# Patient Record
Sex: FEMALE | NOT HISPANIC OR LATINO | URBAN - METROPOLITAN AREA
[De-identification: names, ages, dates, MRNs, and addresses within clinical notes are randomized per-mention and may not be internally consistent; named-entity substitution may affect disease eponyms.]

---

## 2018-09-22 ENCOUNTER — INPATIENT (INPATIENT)
Facility: HOSPITAL | Age: 58
LOS: 0 days | Discharge: HOME | DRG: 158 | End: 2018-09-23
Attending: SURGERY | Admitting: SURGERY
Payer: MEDICAID

## 2018-09-22 VITALS
SYSTOLIC BLOOD PRESSURE: 155 MMHG | HEART RATE: 84 BPM | HEIGHT: 62 IN | OXYGEN SATURATION: 99 % | DIASTOLIC BLOOD PRESSURE: 94 MMHG | WEIGHT: 110.01 LBS | TEMPERATURE: 99 F | RESPIRATION RATE: 16 BRPM

## 2018-09-22 DIAGNOSIS — S02.609A FRACTURE OF MANDIBLE, UNSPECIFIED, INITIAL ENCOUNTER FOR CLOSED FRACTURE: ICD-10-CM

## 2018-09-22 PROCEDURE — 70486 CT MAXILLOFACIAL W/O DYE: CPT | Mod: 26

## 2018-09-22 PROCEDURE — 72125 CT NECK SPINE W/O DYE: CPT | Mod: 26

## 2018-09-22 PROCEDURE — 70450 CT HEAD/BRAIN W/O DYE: CPT | Mod: 26

## 2018-09-22 PROCEDURE — 71046 X-RAY EXAM CHEST 2 VIEWS: CPT | Mod: 26

## 2018-09-22 PROCEDURE — 99285 EMERGENCY DEPT VISIT HI MDM: CPT | Mod: 25

## 2018-09-22 PROCEDURE — 93010 ELECTROCARDIOGRAM REPORT: CPT

## 2018-09-22 RX ORDER — ENOXAPARIN SODIUM 100 MG/ML
40 INJECTION SUBCUTANEOUS DAILY
Qty: 0 | Refills: 0 | Status: DISCONTINUED | OUTPATIENT
Start: 2018-09-22 | End: 2018-09-23

## 2018-09-22 RX ORDER — TETANUS TOXOID, REDUCED DIPHTHERIA TOXOID AND ACELLULAR PERTUSSIS VACCINE, ADSORBED 5; 2.5; 8; 8; 2.5 [IU]/.5ML; [IU]/.5ML; UG/.5ML; UG/.5ML; UG/.5ML
0.5 SUSPENSION INTRAMUSCULAR ONCE
Qty: 0 | Refills: 0 | Status: COMPLETED | OUTPATIENT
Start: 2018-09-22 | End: 2018-09-22

## 2018-09-22 RX ORDER — ACETAMINOPHEN 500 MG
975 TABLET ORAL EVERY 6 HOURS
Qty: 0 | Refills: 0 | Status: DISCONTINUED | OUTPATIENT
Start: 2018-09-22 | End: 2018-09-23

## 2018-09-22 RX ORDER — OXYCODONE HYDROCHLORIDE 5 MG/1
5 TABLET ORAL EVERY 6 HOURS
Qty: 0 | Refills: 0 | Status: DISCONTINUED | OUTPATIENT
Start: 2018-09-22 | End: 2018-09-23

## 2018-09-22 RX ORDER — SODIUM CHLORIDE 9 MG/ML
1000 INJECTION, SOLUTION INTRAVENOUS
Qty: 0 | Refills: 0 | Status: DISCONTINUED | OUTPATIENT
Start: 2018-09-22 | End: 2018-09-23

## 2018-09-22 RX ORDER — ACETAMINOPHEN 500 MG
975 TABLET ORAL ONCE
Qty: 0 | Refills: 0 | Status: COMPLETED | OUTPATIENT
Start: 2018-09-22 | End: 2018-09-22

## 2018-09-22 RX ORDER — PENICILLIN G POTASSIUM 5000000 [IU]/1
2 POWDER, FOR SOLUTION INTRAMUSCULAR; INTRAPLEURAL; INTRATHECAL; INTRAVENOUS EVERY 6 HOURS
Qty: 0 | Refills: 0 | Status: DISCONTINUED | OUTPATIENT
Start: 2018-09-22 | End: 2018-09-23

## 2018-09-22 RX ADMIN — PENICILLIN G POTASSIUM 100 MILLION UNIT(S): 5000000 POWDER, FOR SOLUTION INTRAMUSCULAR; INTRAPLEURAL; INTRATHECAL; INTRAVENOUS at 23:38

## 2018-09-22 RX ADMIN — TETANUS TOXOID, REDUCED DIPHTHERIA TOXOID AND ACELLULAR PERTUSSIS VACCINE, ADSORBED 0.5 MILLILITER(S): 5; 2.5; 8; 8; 2.5 SUSPENSION INTRAMUSCULAR at 20:20

## 2018-09-22 RX ADMIN — Medication 975 MILLIGRAM(S): at 20:21

## 2018-09-22 RX ADMIN — Medication 975 MILLIGRAM(S): at 23:01

## 2018-09-22 NOTE — H&P ADULT - NSHPLABSRESULTS_GEN_ALL_CORE
CT FACE: Comminuted right parasymphyseal mandibular body fracture and   complete transverse and displaced fracture of the neck of the right   mandibular condyle. Medial displacement of the right condylar head with   widening of the TMJ.    Results discussed with Dr. Dhaliwal by Dr. Quinones at 9:00 PM 9/22/18

## 2018-09-22 NOTE — H&P ADULT - NSHPRISKHEPCSCREEN_GEN_A_CORE
Past Medical History:   Diagnosis Date    Bipolar 1 disorder 2011    Hypertension        History reviewed. No pertinent surgical history.    Review of patient's allergies indicates:  No Known Allergies    No current facility-administered medications on file prior to encounter.      Current Outpatient Medications on File Prior to Encounter   Medication Sig    aspirin (ECOTRIN) 81 MG EC tablet Take 1 tablet (81 mg total) by mouth once daily.    atorvastatin (LIPITOR) 80 MG tablet Take 1 tablet (80 mg total) by mouth once daily.    divalproex (DEPAKOTE) 250 MG EC tablet Take 500 mg by mouth 3 (three) times daily. 250mg daily  500mg afternoon and 500mg nightly    ondansetron (ZOFRAN-ODT) 4 MG TbDL Take 1 tablet (4 mg total) by mouth every 8 (eight) hours as needed (nausea).    pantoprazole (PROTONIX) 40 MG tablet Take 1 tablet (40 mg total) by mouth once daily.    QUEtiapine (SEROQUEL) 100 MG Tab Take by mouth every evening.    [DISCONTINUED] carvedilol (COREG) 6.25 MG tablet Take 0.5 tablets (3.125 mg total) by mouth 2 (two) times daily.    oxyCODONE-acetaminophen (PERCOCET) 5-325 mg per tablet Take 1 tablet by mouth every 6 (six) hours as needed for Pain.    [DISCONTINUED] valACYclovir (VALTREX) 1000 MG tablet Take 1 tablet (1,000 mg total) by mouth 3 (three) times daily. for 7 days     Family History     Problem Relation (Age of Onset)    Cancer Mother    Diabetes Father    Heart disease Father        Tobacco Use    Smoking status: Never Smoker    Smokeless tobacco: Never Used   Substance and Sexual Activity    Alcohol use: No    Drug use: No    Sexual activity: Yes     Partners: Female     Review of Systems   Constitution: Negative for chills, decreased appetite, diaphoresis, fever, weakness, malaise/fatigue, night sweats, weight gain and weight loss.   Eyes: Negative.    Cardiovascular: Negative for chest pain, irregular heartbeat, leg swelling, near-syncope, orthopnea, palpitations, paroxysmal  nocturnal dyspnea and syncope.   Respiratory: Negative for cough, shortness of breath, sputum production and wheezing.    Endocrine: Negative.    Hematologic/Lymphatic: Negative for bleeding problem.   Skin: Negative for color change, flushing, rash and suspicious lesions.   Musculoskeletal: Negative.    Gastrointestinal: Negative for bloating, abdominal pain, change in bowel habit, constipation, diarrhea, heartburn, melena, nausea and vomiting.   Genitourinary: Negative for flank pain, frequency, hematuria, incomplete emptying and urgency.   Neurological: Negative for dizziness, focal weakness, headaches, light-headedness, numbness, paresthesias, seizures and sensory change.   Psychiatric/Behavioral: Negative for altered mental status. The patient is not nervous/anxious.      Objective:     Vital Signs (Most Recent):  Temp: 98.4 °F (36.9 °C) (09/02/18 1152)  Pulse: 64 (09/02/18 1200)  Resp: 17 (09/02/18 1152)  BP: 117/71 (09/02/18 1152)  SpO2: 99 % (09/02/18 1152) Vital Signs (24h Range):  Temp:  [97.6 °F (36.4 °C)-98.4 °F (36.9 °C)] 98.4 °F (36.9 °C)  Pulse:  [61-96] 64  Resp:  [14-20] 17  SpO2:  [95 %-99 %] 99 %  BP: (105-146)/(55-99) 117/71       Weight: 124.3 kg (274 lb 0.5 oz)  Body mass index is 34.25 kg/m².    SpO2: 99 %  O2 Device (Oxygen Therapy): room air    Physical Exam   Constitutional: He is oriented to person, place, and time. He appears well-developed and well-nourished. No distress.   HENT:   Head: Normocephalic and atraumatic.   Mouth/Throat: Oropharynx is clear and moist.   Eyes: EOM are normal. Pupils are equal, round, and reactive to light.   Neck: Normal range of motion. Neck supple. No JVD present.   Cardiovascular: Normal rate and regular rhythm. Exam reveals no gallop and no friction rub.   No murmur heard.  Pulmonary/Chest: Effort normal and breath sounds normal. No respiratory distress. He has no wheezes. He has no rales. He exhibits no tenderness.   Abdominal: Soft. Bowel sounds are  normal. He exhibits no distension. There is no tenderness.   Musculoskeletal: Normal range of motion. He exhibits no edema.   Neurological: He is alert and oriented to person, place, and time.   Skin: Skin is warm and dry. No erythema.   Psychiatric: He has a normal mood and affect. His behavior is normal. Judgment and thought content normal.       Significant Labs: All pertinent lab results from the last 24 hours have been reviewed.    Significant Imaging: Reviewed in EPIC.   Unable to offer due to clinical condition

## 2018-09-22 NOTE — ED PROVIDER NOTE - MOUTH
MANDIBULAR SWELLING/+mild trismus. No drooling. Limited ability to open mouth, secondary to pain. Facial edema over mandible, R greater than L. Area of ecchymosis under chin. Mal occlusion by Hx. +Tenderness over mandible R side. Facial muscles symmetric.

## 2018-09-22 NOTE — H&P ADULT - ASSESSMENT
A/P 58F who sustained a symphysis and right condyle neck fracture after a mechanical fall last night.  -admit to plastic surgery for surgical repair of fractures  -liquid diet  -pre-op labs (CBC, BMP, coags, type and sceen, EKG)  -elevate HOB  -NPOpMN  -tylenol prn for pain, oxy for severe  -IVF  -lovenox  -SCDs  -discussed with Dr. Pyle

## 2018-09-22 NOTE — ED ADULT NURSE NOTE - NSIMPLEMENTINTERV_GEN_ALL_ED
Implemented All Fall with Harm Risk Interventions:  Fork to call system. Call bell, personal items and telephone within reach. Instruct patient to call for assistance. Room bathroom lighting operational. Non-slip footwear when patient is off stretcher. Physically safe environment: no spills, clutter or unnecessary equipment. Stretcher in lowest position, wheels locked, appropriate side rails in place. Provide visual cue, wrist band, yellow gown, etc. Monitor gait and stability. Monitor for mental status changes and reorient to person, place, and time. Review medications for side effects contributing to fall risk. Reinforce activity limits and safety measures with patient and family. Provide visual clues: red socks.

## 2018-09-22 NOTE — PROGRESS NOTE ADULT - SUBJECTIVE AND OBJECTIVE BOX
Please refer to previous dental consult note for additional information.     INTERVAL HPI/OVERNIGHT EVENTS: 59 yo F with no significant medical history presents to ED due to lower right facial pain and inability to open/close. Pt reports falling last night while wearing heels and reports LOC. Pt reports pain went away, but returned earlier today. Pt also has been unable eat due to trismus.    MEDICATIONS  (STANDING):    MEDICATIONS  (PRN): tylenol      Allergies    No Known Allergies    Intolerances        Vital Signs Last 24 Hrs  T(C): 36.4 (22 Sep 2018 20:12), Max: 37 (22 Sep 2018 19:10)  T(F): 97.6 (22 Sep 2018 20:12), Max: 98.6 (22 Sep 2018 19:10)  HR: 65 (22 Sep 2018 21:10) (65 - 84)  BP: 159/95 (22 Sep 2018 21:10) (155/94 - 181/99)  BP(mean): --  RR: 16 (22 Sep 2018 21:10) (16 - 16)  SpO2: 98% (22 Sep 2018 21:10) (98% - 99%)      CLINICAL EXAM: Limited oral exam completed due to trismus.  EOE: No LAD. Trismus with jaw deviation to right upon opening. Swelling present associated with lower right jaw that is tender to palpation.     IOE: Limited intraoral exam completed due to trismus. Pt reports step in occlusion between tooth #30 and #31. Pain on palpation of buccal mucosa around #30 and #31.    RADIOLOGY & ADDITIONAL TESTS:  Head CT without contrast taken but not read by radiologist prior to dental consult. Mandibular fracture appears to be present    ASSESSMENT: 59 yo F with no significant medical history has mandibular fracture s/p fall in heels.     PLAN:  Consult facial trauma team to address mandibular fracture.    PROCEDURE:  No dental procedure indicated at this time.    RECOMMENDATIONS:  1) Consult facial trauma team to address mandibular fracture.  2) F/U with outpatient dentist for comprehensive dental care upon discharge.  3) If swelling, fever, difficulty breathing/swallowing occurs, page Dental.     Medina Mcgrath DDS, Pager #[phone] Please refer to previous dental consult note for additional information.     INTERVAL HPI/OVERNIGHT EVENTS: 57 yo F with no significant medical history presents to ED due to lower right facial pain and inability to open/close. Pt reports falling last night while wearing heels and hitting her chin. Pt reports LOC. Pt reports pain went away, but returned earlier today. Pt also has been unable eat due to trismus.    MEDICATIONS  (STANDING):    MEDICATIONS  (PRN): tylenol      Allergies    No Known Allergies    Intolerances        Vital Signs Last 24 Hrs  T(C): 36.4 (22 Sep 2018 20:12), Max: 37 (22 Sep 2018 19:10)  T(F): 97.6 (22 Sep 2018 20:12), Max: 98.6 (22 Sep 2018 19:10)  HR: 65 (22 Sep 2018 21:10) (65 - 84)  BP: 159/95 (22 Sep 2018 21:10) (155/94 - 181/99)  BP(mean): --  RR: 16 (22 Sep 2018 21:10) (16 - 16)  SpO2: 98% (22 Sep 2018 21:10) (98% - 99%)      CLINICAL EXAM: Limited oral exam completed due to trismus.  EOE: No LAD. Trismus with jaw deviation to right upon opening. Swelling present associated with lower right jaw that is tender to palpation.     IOE: Limited intraoral exam completed due to trismus. Pt reports step in occlusion between tooth #30 and #31. Pain on palpation of buccal mucosa around #30 and #31.    RADIOLOGY & ADDITIONAL TESTS:  Head CT without contrast taken prior to dental consult and reviewed with the following impression:  Comminuted right parasymphyseal mandibular body fracture and complete transverse and displaced fracture of the neck of the right mandibular condyle. Medial displacement of the right condylar head with widening of the TMJ.       ASSESSMENT: 57 yo F with no significant medical history has mandibular fracture s/p fall in heels.     PLAN:  Consult facial trauma team to address mandibular fracture.    PROCEDURE:  No dental procedure indicated at this time.    RECOMMENDATIONS:  1) Consult facial trauma team to address mandibular fracture.  2) F/U with outpatient dentist for comprehensive dental care upon discharge.  3) If swelling, fever, difficulty breathing/swallowing occurs, page Dental.     Medina Mcgrath DDS, Pager #[phone]

## 2018-09-22 NOTE — ED PROVIDER NOTE - MEDICAL DECISION MAKING DETAILS
58F with R facial, chin, hand and knee pain s/p fall last night. Will obtain CT head, neck and facial, administer tetanus vaccine, Tylenol for pain, and reevaluate.

## 2018-09-22 NOTE — ED ADULT NURSE NOTE - OBJECTIVE STATEMENT
57y/o Female presented to the ED from home with complaint of facial pain. A&Ox3, ambulatory. Patient states that she was walking in heels last night when she feel walking upward onto the left side of her face. Patient states she had appx 5 seconds of "blacking out". Patient states she was able to get up and ambulate afterwards. Patient complaining of left sided facial/jaw pain. Describes pain as throbbing and sharp. Rates pain 8/10 at this time. Left side of face and jaw  upon palpation. No bruising noted to left side of face. Laceration noted on bottom of chin. No actively bleeding at this time. Pupils 3 equal/reactive bilaterally. Patient able to follow commands. Denies headache, chest pain, palpitations, fever, chills, N/V/D abdominal pain, numbness, tingling. Family at bedside.

## 2018-09-22 NOTE — H&P ADULT - NSHPPHYSICALEXAM_GEN_ALL_CORE
Gen: NAD  HEENT: CN2-12 intact, EOMI, swelling and pain over right TMJ  Oral: anterior open bite, cross bite, tooth #24, 25 are mobile, no lacerations, maxilla non-mobile  Chest: breathing comfortably on room air  LEs WWP

## 2018-09-22 NOTE — ED PROVIDER NOTE - OBJECTIVE STATEMENT
58F with no significant Hx, presents to the ED with complaints of facial pain s/p mechanical fall in heels on the sidewalk, hitting the R side of her head and chin last night. Pt was not concerned for any symptoms and took x2 Advil with mild relief, 11pm last night. Endorses BL hand and knee pain, mild headache and dental pain with subjective deformity of bottom row of teeth. Admits to decrease in PO intake and trembling, both secondary to pain. Denies visual changes, or any other complaints at this time.

## 2018-09-22 NOTE — ED PROVIDER NOTE - PROGRESS NOTE DETAILS
ATTG: : noted to have mandibular fracture with displacement. will need surgery. Plastics evaluated patient, possible candidate for OR. will check labs, ivf, npo past midnight. admit to hospital for further care.

## 2018-09-23 ENCOUNTER — TRANSCRIPTION ENCOUNTER (OUTPATIENT)
Age: 58
End: 2018-09-23

## 2018-09-23 VITALS
DIASTOLIC BLOOD PRESSURE: 83 MMHG | HEART RATE: 60 BPM | TEMPERATURE: 98 F | RESPIRATION RATE: 18 BRPM | SYSTOLIC BLOOD PRESSURE: 150 MMHG | OXYGEN SATURATION: 98 %

## 2018-09-23 LAB
ALBUMIN SERPL ELPH-MCNC: 4.7 G/DL — SIGNIFICANT CHANGE UP (ref 3.3–5)
ALP SERPL-CCNC: 48 U/L — SIGNIFICANT CHANGE UP (ref 40–120)
ALT FLD-CCNC: 15 U/L — SIGNIFICANT CHANGE UP (ref 10–45)
ANION GAP SERPL CALC-SCNC: 11 MMOL/L — SIGNIFICANT CHANGE UP (ref 5–17)
APTT BLD: 24.7 SEC — LOW (ref 27.5–37.4)
AST SERPL-CCNC: 20 U/L — SIGNIFICANT CHANGE UP (ref 10–40)
BASOPHILS # BLD AUTO: 0 K/UL — SIGNIFICANT CHANGE UP (ref 0–0.2)
BASOPHILS NFR BLD AUTO: 0.2 % — SIGNIFICANT CHANGE UP (ref 0–2)
BILIRUB SERPL-MCNC: 0.7 MG/DL — SIGNIFICANT CHANGE UP (ref 0.2–1.2)
BLD GP AB SCN SERPL QL: NEGATIVE — SIGNIFICANT CHANGE UP
BUN SERPL-MCNC: 14 MG/DL — SIGNIFICANT CHANGE UP (ref 7–23)
CALCIUM SERPL-MCNC: 9 MG/DL — SIGNIFICANT CHANGE UP (ref 8.4–10.5)
CHLORIDE SERPL-SCNC: 105 MMOL/L — SIGNIFICANT CHANGE UP (ref 96–108)
CO2 SERPL-SCNC: 24 MMOL/L — SIGNIFICANT CHANGE UP (ref 22–31)
CREAT SERPL-MCNC: 0.75 MG/DL — SIGNIFICANT CHANGE UP (ref 0.5–1.3)
EOSINOPHIL # BLD AUTO: 0.1 K/UL — SIGNIFICANT CHANGE UP (ref 0–0.5)
EOSINOPHIL NFR BLD AUTO: 1.4 % — SIGNIFICANT CHANGE UP (ref 0–6)
GLUCOSE SERPL-MCNC: 168 MG/DL — HIGH (ref 70–99)
HCT VFR BLD CALC: 40.1 % — SIGNIFICANT CHANGE UP (ref 34.5–45)
HGB BLD-MCNC: 13.4 G/DL — SIGNIFICANT CHANGE UP (ref 11.5–15.5)
INR BLD: 0.96 RATIO — SIGNIFICANT CHANGE UP (ref 0.88–1.16)
LYMPHOCYTES # BLD AUTO: 1.9 K/UL — SIGNIFICANT CHANGE UP (ref 1–3.3)
LYMPHOCYTES # BLD AUTO: 27.8 % — SIGNIFICANT CHANGE UP (ref 13–44)
MCHC RBC-ENTMCNC: 31.9 PG — SIGNIFICANT CHANGE UP (ref 27–34)
MCHC RBC-ENTMCNC: 33.4 GM/DL — SIGNIFICANT CHANGE UP (ref 32–36)
MCV RBC AUTO: 95.3 FL — SIGNIFICANT CHANGE UP (ref 80–100)
MONOCYTES # BLD AUTO: 0.3 K/UL — SIGNIFICANT CHANGE UP (ref 0–0.9)
MONOCYTES NFR BLD AUTO: 4.2 % — SIGNIFICANT CHANGE UP (ref 2–14)
NEUTROPHILS # BLD AUTO: 4.6 K/UL — SIGNIFICANT CHANGE UP (ref 1.8–7.4)
NEUTROPHILS NFR BLD AUTO: 66.4 % — SIGNIFICANT CHANGE UP (ref 43–77)
PLATELET # BLD AUTO: 155 K/UL — SIGNIFICANT CHANGE UP (ref 150–400)
POTASSIUM SERPL-MCNC: 3.4 MMOL/L — LOW (ref 3.5–5.3)
POTASSIUM SERPL-SCNC: 3.4 MMOL/L — LOW (ref 3.5–5.3)
PROT SERPL-MCNC: 7.1 G/DL — SIGNIFICANT CHANGE UP (ref 6–8.3)
PROTHROM AB SERPL-ACNC: 10.5 SEC — SIGNIFICANT CHANGE UP (ref 9.8–12.7)
RBC # BLD: 4.21 M/UL — SIGNIFICANT CHANGE UP (ref 3.8–5.2)
RBC # FLD: 10.8 % — SIGNIFICANT CHANGE UP (ref 10.3–14.5)
RH IG SCN BLD-IMP: POSITIVE — SIGNIFICANT CHANGE UP
SODIUM SERPL-SCNC: 140 MMOL/L — SIGNIFICANT CHANGE UP (ref 135–145)
WBC # BLD: 6.9 K/UL — SIGNIFICANT CHANGE UP (ref 3.8–10.5)
WBC # FLD AUTO: 6.9 K/UL — SIGNIFICANT CHANGE UP (ref 3.8–10.5)

## 2018-09-23 RX ORDER — INFLUENZA VIRUS VACCINE 15; 15; 15; 15 UG/.5ML; UG/.5ML; UG/.5ML; UG/.5ML
0.5 SUSPENSION INTRAMUSCULAR ONCE
Qty: 0 | Refills: 0 | Status: DISCONTINUED | OUTPATIENT
Start: 2018-09-23 | End: 2018-09-23

## 2018-09-23 RX ADMIN — PENICILLIN G POTASSIUM 100 MILLION UNIT(S): 5000000 POWDER, FOR SOLUTION INTRAMUSCULAR; INTRAPLEURAL; INTRATHECAL; INTRAVENOUS at 05:20

## 2018-09-23 RX ADMIN — OXYCODONE HYDROCHLORIDE 5 MILLIGRAM(S): 5 TABLET ORAL at 00:21

## 2018-09-23 RX ADMIN — SODIUM CHLORIDE 100 MILLILITER(S): 9 INJECTION, SOLUTION INTRAVENOUS at 00:00

## 2018-09-23 RX ADMIN — OXYCODONE HYDROCHLORIDE 5 MILLIGRAM(S): 5 TABLET ORAL at 00:49

## 2018-09-23 RX ADMIN — Medication 975 MILLIGRAM(S): at 08:49

## 2018-09-23 NOTE — DISCHARGE NOTE ADULT - CARE PROVIDER_API CALL
Nitin Pyle), Plastic Surgery; Surgery  [address]  [place] [address]  Phone: [phone]  Fax: [phone]

## 2018-09-23 NOTE — PROGRESS NOTE ADULT - SUBJECTIVE AND OBJECTIVE BOX
S: pt seen and examined. doing well    O;Vital Signs Last 24 Hrs  T(C): 36.7 (23 Sep 2018 05:43), Max: 37.2 (22 Sep 2018 23:50)  T(F): 98.1 (23 Sep 2018 05:43), Max: 99 (22 Sep 2018 23:50)  HR: 54 (23 Sep 2018 05:43) (54 - 84)  BP: 126/70 (23 Sep 2018 05:43) (126/70 - 181/99)  BP(mean): --  RR: 16 (23 Sep 2018 05:43) (16 - 16)  SpO2: 97% (23 Sep 2018 05:43) (97% - 99%)    Gen NAD  HEENT: numbness over right V3, otherwise stable examination with anterior open bite and cross bite, tenderness over right condyle      LABS:                         13.4   6.9   )-----------( 155      ( 22 Sep 2018 23:56 )             40.1     09-22    140  |  105  |  14  ----------------------------<  168<H>  3.4<L>   |  24  |  0.75    Ca    9.0      22 Sep 2018 23:56    TPro  7.1  /  Alb  4.7  /  TBili  0.7  /  DBili  x   /  AST  20  /  ALT  15  /  AlkPhos  48  09-22    PT/INR - ( 22 Sep 2018 23:56 )   PT: 10.5 sec;   INR: 0.96 ratio         PTT - ( 22 Sep 2018 23:56 )  PTT:24.7 sec          RADIOLOGY, EKG & ADDITIONAL TESTS: Reviewed.

## 2018-09-23 NOTE — DISCHARGE NOTE ADULT - HOSPITAL COURSE
58F admitted to [facility] on 9/22 for a mandible fracture. It was decided between the patient, her daughter, and the surgeon to perform the necessary surgery as an outpatient on Thursday (9/26); as such, she was discharged with PO antibiotics and appropriate instructions for follow-up.

## 2018-09-23 NOTE — DISCHARGE NOTE ADULT - CARE PLAN
Principal Discharge DX:	Jaw fracture, closed, initial encounter  Goal:	Recovery  Assessment and plan of treatment:	Take medications as prescribed.     Please brush your teeth twice per day.    Keep your head elevated as much as possible throughout the day and night until cleared.     Do not eat or drink anything after midnight on Wednesday (9/25) in preparation for your surgery on Thursday (9/26).

## 2018-09-23 NOTE — DISCHARGE NOTE ADULT - PATIENT PORTAL LINK FT
You can access the [facility] Patient Portal, offered by [facility], by registering with the following website: [email or web address]

## 2018-09-23 NOTE — DISCHARGE NOTE ADULT - ADDITIONAL INSTRUCTIONS
Dr. Nitin Pyle will call tomorrow with information regarding your surgery on Thursday (9/26).  Also, you may call [phone] tomorrow for more information.

## 2018-09-23 NOTE — PROGRESS NOTE ADULT - ASSESSMENT
A/P: 58F s/p mechanical fall with symphysis fracture and right condyle fracture  -full liquid diet  -clindamycin  -pain control with tylenol  -d/c home with clindamycin, follow up outpatient for surgical repair as outpatient on Thursday 9/27

## 2018-09-23 NOTE — DISCHARGE NOTE ADULT - MEDICATION SUMMARY - MEDICATIONS TO TAKE
I will START or STAY ON the medications listed below when I get home from the hospital:    Tylenol 325 mg oral tablet  -- 2 tab(s) by mouth every 4 hours, As Needed for pain  -- Indication: For Pain    Motrin 400 mg oral tablet  -- 2 tab(s) by mouth every 6 hours as needed for pain  -- Indication: For Pain    clindamycin 300 mg oral capsule  -- 1 cap(s) by mouth every 6 hours   -- Finish all this medication unless otherwise directed by prescriber.  Medication should be taken with plenty of water.    -- Indication: For Anti-infective

## 2018-09-23 NOTE — DISCHARGE NOTE ADULT - PLAN OF CARE
Take medications as prescribed.     Please brush your teeth twice per day.    Keep your head elevated as much as possible throughout the day and night until cleared.     Do not eat or drink anything after midnight on Wednesday (9/25) in preparation for your surgery on Thursday (9/26). Recovery

## 2018-09-26 ENCOUNTER — TRANSCRIPTION ENCOUNTER (OUTPATIENT)
Age: 58
End: 2018-09-26

## 2018-09-26 ENCOUNTER — OUTPATIENT (OUTPATIENT)
Dept: OUTPATIENT SERVICES | Facility: HOSPITAL | Age: 58
LOS: 1 days | End: 2018-09-26
Payer: MEDICAID

## 2018-09-26 VITALS
SYSTOLIC BLOOD PRESSURE: 142 MMHG | WEIGHT: 104.94 LBS | HEART RATE: 70 BPM | RESPIRATION RATE: 16 BRPM | HEIGHT: 61.5 IN | TEMPERATURE: 97 F | DIASTOLIC BLOOD PRESSURE: 80 MMHG

## 2018-09-26 DIAGNOSIS — K02.9 DENTAL CARIES, UNSPECIFIED: ICD-10-CM

## 2018-09-26 DIAGNOSIS — S02.609B FRACTURE OF MANDIBLE, UNSPECIFIED, INITIAL ENCOUNTER FOR OPEN FRACTURE: ICD-10-CM

## 2018-09-26 DIAGNOSIS — S02.609A FRACTURE OF MANDIBLE, UNSPECIFIED, INITIAL ENCOUNTER FOR CLOSED FRACTURE: ICD-10-CM

## 2018-09-26 LAB
BLD GP AB SCN SERPL QL: NEGATIVE — SIGNIFICANT CHANGE UP
BUN SERPL-MCNC: 20 MG/DL — SIGNIFICANT CHANGE UP (ref 7–23)
CALCIUM SERPL-MCNC: 8.9 MG/DL — SIGNIFICANT CHANGE UP (ref 8.4–10.5)
CHLORIDE SERPL-SCNC: 104 MMOL/L — SIGNIFICANT CHANGE UP (ref 98–107)
CO2 SERPL-SCNC: 23 MMOL/L — SIGNIFICANT CHANGE UP (ref 22–31)
CREAT SERPL-MCNC: 0.67 MG/DL — SIGNIFICANT CHANGE UP (ref 0.5–1.3)
GLUCOSE SERPL-MCNC: 93 MG/DL — SIGNIFICANT CHANGE UP (ref 70–99)
HCT VFR BLD CALC: 40.3 % — SIGNIFICANT CHANGE UP (ref 34.5–45)
HGB BLD-MCNC: 13.2 G/DL — SIGNIFICANT CHANGE UP (ref 11.5–15.5)
MCHC RBC-ENTMCNC: 31.2 PG — SIGNIFICANT CHANGE UP (ref 27–34)
MCHC RBC-ENTMCNC: 32.8 % — SIGNIFICANT CHANGE UP (ref 32–36)
MCV RBC AUTO: 95.3 FL — SIGNIFICANT CHANGE UP (ref 80–100)
NRBC # FLD: 0 — SIGNIFICANT CHANGE UP
PLATELET # BLD AUTO: 193 K/UL — SIGNIFICANT CHANGE UP (ref 150–400)
PMV BLD: 10.8 FL — SIGNIFICANT CHANGE UP (ref 7–13)
POTASSIUM SERPL-MCNC: 4 MMOL/L — SIGNIFICANT CHANGE UP (ref 3.5–5.3)
POTASSIUM SERPL-SCNC: 4 MMOL/L — SIGNIFICANT CHANGE UP (ref 3.5–5.3)
RBC # BLD: 4.23 M/UL — SIGNIFICANT CHANGE UP (ref 3.8–5.2)
RBC # FLD: 11.5 % — SIGNIFICANT CHANGE UP (ref 10.3–14.5)
RH IG SCN BLD-IMP: POSITIVE — SIGNIFICANT CHANGE UP
SODIUM SERPL-SCNC: 140 MMOL/L — SIGNIFICANT CHANGE UP (ref 135–145)
WBC # BLD: 3.88 K/UL — SIGNIFICANT CHANGE UP (ref 3.8–10.5)
WBC # FLD AUTO: 3.88 K/UL — SIGNIFICANT CHANGE UP (ref 3.8–10.5)

## 2018-09-26 PROCEDURE — 93010 ELECTROCARDIOGRAM REPORT: CPT

## 2018-09-26 RX ORDER — ACETAMINOPHEN 500 MG
2 TABLET ORAL
Qty: 0 | Refills: 0 | COMMUNITY

## 2018-09-26 RX ORDER — IBUPROFEN 200 MG
1 TABLET ORAL
Qty: 0 | Refills: 0 | COMMUNITY

## 2018-09-26 NOTE — H&P PST ADULT - ENMT COMMENTS
no oral erythema or lesions has difficulty opening mouth wide due to injury no oral erythema or lesions; has difficulty opening mouth wide due to injury

## 2018-09-26 NOTE — H&P PST ADULT - PROBLEM SELECTOR PLAN 2
Await dental consult due to loose front tooth Await dental consult due to loose front tooth  * Need to notify surgeon of pre op dental clearance Await dental consult due to loose front tooth  * Need to notify surgeon of pre op dental clearance--message left on recorder in surgeon's office regarding pre op dental consult due to loose lower front tooth

## 2018-09-26 NOTE — H&P PST ADULT - LYMPHATIC
posterior cervical L/anterior cervical L/anterior cervical R/supraclavicular L/supraclavicular R/posterior cervical R

## 2018-09-26 NOTE — H&P PST ADULT - HISTORY OF PRESENT ILLNESS
This is a 59 y/o female who presents with recent fall and sustained injury to her mandible. Xrays confirmed pathology. Scheduled for ORIF of mandibular fracture on 9-27-18

## 2018-09-26 NOTE — H&P PST ADULT - PROBLEM SELECTOR PLAN 1
This is a 58 female who is scheduled for ORIF mandibular fracture on 9-27-18  * Given pre op instructions  * Given pre op famotidine

## 2018-09-27 ENCOUNTER — OUTPATIENT (OUTPATIENT)
Dept: OUTPATIENT SERVICES | Facility: HOSPITAL | Age: 58
LOS: 1 days | Discharge: HOME | End: 2018-09-27
Payer: MEDICAID

## 2018-09-27 VITALS
TEMPERATURE: 98 F | RESPIRATION RATE: 16 BRPM | HEART RATE: 65 BPM | DIASTOLIC BLOOD PRESSURE: 89 MMHG | SYSTOLIC BLOOD PRESSURE: 164 MMHG | OXYGEN SATURATION: 98 %

## 2018-09-27 VITALS
HEIGHT: 61.5 IN | HEART RATE: 72 BPM | SYSTOLIC BLOOD PRESSURE: 138 MMHG | TEMPERATURE: 98 F | OXYGEN SATURATION: 98 % | RESPIRATION RATE: 18 BRPM | DIASTOLIC BLOOD PRESSURE: 91 MMHG | WEIGHT: 104.94 LBS

## 2018-09-27 DIAGNOSIS — S02.609B FRACTURE OF MANDIBLE, UNSPECIFIED, INITIAL ENCOUNTER FOR OPEN FRACTURE: ICD-10-CM

## 2018-09-27 PROCEDURE — 21462 OPTX MNDBLR FX W/NTRDNTL: CPT

## 2018-09-27 PROCEDURE — 21445 OPTX MNDBLR/MAX ALV RIDGE FX: CPT | Mod: 59

## 2018-09-27 RX ORDER — ACETAMINOPHEN WITH CODEINE 300MG-30MG
1 TABLET ORAL
Qty: 20 | Refills: 0 | OUTPATIENT
Start: 2018-09-27

## 2018-09-27 RX ORDER — ACETAMINOPHEN WITH CODEINE 300MG-30MG
15 TABLET ORAL
Qty: 300 | Refills: 0 | OUTPATIENT
Start: 2018-09-27

## 2018-09-27 NOTE — ASU DISCHARGE PLAN (ADULT/PEDIATRIC). - ITEMS TO FOLLOWUP WITH YOUR PHYSICIAN'S
follow up with Dr. Pyle in 2 weeks. If you are having uncontrollable vomiting you can cut the wire, otherwise leave them in for the 2 weeks.

## 2018-10-10 ENCOUNTER — APPOINTMENT (OUTPATIENT)
Dept: PLASTIC SURGERY | Facility: CLINIC | Age: 58
End: 2018-10-10
Payer: COMMERCIAL

## 2018-10-10 PROBLEM — S02.609A FRACTURE OF MANDIBLE, UNSPECIFIED, INITIAL ENCOUNTER FOR CLOSED FRACTURE: Chronic | Status: ACTIVE | Noted: 2018-09-26

## 2018-10-10 PROBLEM — Z00.00 ENCOUNTER FOR PREVENTIVE HEALTH EXAMINATION: Status: ACTIVE | Noted: 2018-10-10

## 2018-10-10 PROCEDURE — 99024 POSTOP FOLLOW-UP VISIT: CPT

## 2018-10-31 ENCOUNTER — OUTPATIENT (OUTPATIENT)
Dept: OUTPATIENT SERVICES | Facility: HOSPITAL | Age: 58
LOS: 1 days | End: 2018-10-31

## 2018-10-31 VITALS
RESPIRATION RATE: 16 BRPM | HEART RATE: 78 BPM | SYSTOLIC BLOOD PRESSURE: 140 MMHG | TEMPERATURE: 99 F | HEIGHT: 60.25 IN | WEIGHT: 102.07 LBS | DIASTOLIC BLOOD PRESSURE: 84 MMHG

## 2018-10-31 DIAGNOSIS — S02.609A FRACTURE OF MANDIBLE, UNSPECIFIED, INITIAL ENCOUNTER FOR CLOSED FRACTURE: ICD-10-CM

## 2018-10-31 DIAGNOSIS — S02.609K FRACTURE OF MANDIBLE, UNSPECIFIED, SUBSEQUENT ENCOUNTER FOR FRACTURE WITH NONUNION: Chronic | ICD-10-CM

## 2018-10-31 LAB
BLD GP AB SCN SERPL QL: NEGATIVE — SIGNIFICANT CHANGE UP
HCT VFR BLD CALC: 36.9 % — SIGNIFICANT CHANGE UP (ref 34.5–45)
HGB BLD-MCNC: 12.1 G/DL — SIGNIFICANT CHANGE UP (ref 11.5–15.5)
MCHC RBC-ENTMCNC: 31.1 PG — SIGNIFICANT CHANGE UP (ref 27–34)
MCHC RBC-ENTMCNC: 32.8 % — SIGNIFICANT CHANGE UP (ref 32–36)
MCV RBC AUTO: 94.9 FL — SIGNIFICANT CHANGE UP (ref 80–100)
NRBC # FLD: 0 — SIGNIFICANT CHANGE UP
PLATELET # BLD AUTO: 209 K/UL — SIGNIFICANT CHANGE UP (ref 150–400)
PMV BLD: 10.6 FL — SIGNIFICANT CHANGE UP (ref 7–13)
RBC # BLD: 3.89 M/UL — SIGNIFICANT CHANGE UP (ref 3.8–5.2)
RBC # FLD: 11.6 % — SIGNIFICANT CHANGE UP (ref 10.3–14.5)
RH IG SCN BLD-IMP: POSITIVE — SIGNIFICANT CHANGE UP
WBC # BLD: 4.39 K/UL — SIGNIFICANT CHANGE UP (ref 3.8–10.5)
WBC # FLD AUTO: 4.39 K/UL — SIGNIFICANT CHANGE UP (ref 3.8–10.5)

## 2018-10-31 NOTE — H&P PST ADULT - RS GEN PE MLT RESP DETAILS PC
clear to auscultation bilaterally/good air movement/respirations non-labored/breath sounds equal/airway patent

## 2018-10-31 NOTE — H&P PST ADULT - NEGATIVE NEUROLOGICAL SYMPTOMS
no paresthesias/no transient paralysis/no weakness/no generalized seizures/no syncope/no focal seizures

## 2018-10-31 NOTE — H&P PST ADULT - MUSCULOSKELETAL
details… detailed exam decreased ROM due to pain/Jaw pain with opening and chewing - hx of fracture with screws placed/diminished strength

## 2018-10-31 NOTE — H&P PST ADULT - NEGATIVE ENMT SYMPTOMS
no hearing difficulty/no sinus symptoms/no throat pain/no dysphagia/no vertigo/no nasal congestion/no tinnitus

## 2018-10-31 NOTE — H&P PST ADULT - PSH
delivery delivered  son,   delivery delivered  son,   Fracture of jaw with nonunion  screws placed

## 2018-10-31 NOTE — H&P PST ADULT - PROBLEM SELECTOR PLAN 1
Scheduled for surgery 11/13/18   Preop instructions provided and patient verbalizes understanding.  Labs done and results pending.   Famotidine provided with instructions.   OR Booking notified of NGA precautions and small oral opening

## 2018-10-31 NOTE — H&P PST ADULT - HISTORY OF PRESENT ILLNESS
This is a 59 y/o female who presents with recent fall and sustained injury to her mandible. Xrays confirmed pathology. Scheduled for ORIF of mandibular fracture on 9-27-18 Pt is a 58 yr old female scheduled for Removal of maxillomandibular fixation Screws with Dr Pyle 11/13/18 - pt had screws placed 9/18 for fracture sustained in fall. Pt now c/o of left sided jaw pain and asymmetry of jaw with difficulty opening mouth.

## 2018-10-31 NOTE — H&P PST ADULT - ENMT COMMENTS
Pt had jaw fracture 9/18 and screws placed in mandible - pt now c/o of left sided jaw pain and asymmetry of jaw and to have screws removed - pt cannot open mouth more than 1 finger width and has to eat soft diet Pt unable to open mouth more than fingerwidth - pain left side jaw with difficulty chewing

## 2018-11-12 ENCOUNTER — TRANSCRIPTION ENCOUNTER (OUTPATIENT)
Age: 58
End: 2018-11-12

## 2018-11-13 ENCOUNTER — OUTPATIENT (OUTPATIENT)
Dept: OUTPATIENT SERVICES | Facility: HOSPITAL | Age: 58
LOS: 1 days | Discharge: HOME | End: 2018-11-13
Payer: MEDICAID

## 2018-11-13 ENCOUNTER — RESULT REVIEW (OUTPATIENT)
Age: 58
End: 2018-11-13

## 2018-11-13 VITALS
TEMPERATURE: 98 F | WEIGHT: 102.07 LBS | OXYGEN SATURATION: 98 % | RESPIRATION RATE: 16 BRPM | HEART RATE: 69 BPM | DIASTOLIC BLOOD PRESSURE: 84 MMHG | HEIGHT: 60.25 IN | SYSTOLIC BLOOD PRESSURE: 140 MMHG

## 2018-11-13 VITALS — DIASTOLIC BLOOD PRESSURE: 87 MMHG | SYSTOLIC BLOOD PRESSURE: 152 MMHG | OXYGEN SATURATION: 100 % | HEART RATE: 58 BPM

## 2018-11-13 DIAGNOSIS — S02.609A FRACTURE OF MANDIBLE, UNSPECIFIED, INITIAL ENCOUNTER FOR CLOSED FRACTURE: ICD-10-CM

## 2018-11-13 DIAGNOSIS — S02.609K FRACTURE OF MANDIBLE, UNSPECIFIED, SUBSEQUENT ENCOUNTER FOR FRACTURE WITH NONUNION: Chronic | ICD-10-CM

## 2018-11-13 PROCEDURE — 20694 RMVL EXT FIXJ SYS UNDER ANES: CPT | Mod: 58

## 2018-11-13 PROCEDURE — 88300 SURGICAL PATH GROSS: CPT | Mod: 26

## 2018-11-13 RX ORDER — SODIUM CHLORIDE 9 MG/ML
1000 INJECTION, SOLUTION INTRAVENOUS
Qty: 0 | Refills: 0 | Status: DISCONTINUED | OUTPATIENT
Start: 2018-11-13 | End: 2018-11-28

## 2018-11-13 NOTE — ASU DISCHARGE PLAN (ADULT/PEDIATRIC). - NOTIFY
Bleeding that does not stop Pain not relieved by Medications/Fever greater than 101/Persistent Nausea and Vomiting/Bleeding that does not stop

## 2018-11-13 NOTE — ASU DISCHARGE PLAN (ADULT/PEDIATRIC). - MEDICATION SUMMARY - MEDICATIONS TO TAKE
I will START or STAY ON the medications listed below when I get home from the hospital:    calcium  -- 1 tab(s) by mouth once a day  -- Indication: For home med

## 2018-12-26 PROCEDURE — 86901 BLOOD TYPING SEROLOGIC RH(D): CPT

## 2018-12-26 PROCEDURE — 72125 CT NECK SPINE W/O DYE: CPT

## 2018-12-26 PROCEDURE — 80053 COMPREHEN METABOLIC PANEL: CPT

## 2018-12-26 PROCEDURE — 85027 COMPLETE CBC AUTOMATED: CPT

## 2018-12-26 PROCEDURE — 70450 CT HEAD/BRAIN W/O DYE: CPT

## 2018-12-26 PROCEDURE — 85610 PROTHROMBIN TIME: CPT

## 2018-12-26 PROCEDURE — 71046 X-RAY EXAM CHEST 2 VIEWS: CPT

## 2018-12-26 PROCEDURE — 86850 RBC ANTIBODY SCREEN: CPT

## 2018-12-26 PROCEDURE — 76377 3D RENDER W/INTRP POSTPROCES: CPT

## 2018-12-26 PROCEDURE — 86900 BLOOD TYPING SEROLOGIC ABO: CPT

## 2018-12-26 PROCEDURE — 93005 ELECTROCARDIOGRAM TRACING: CPT

## 2018-12-26 PROCEDURE — 85730 THROMBOPLASTIN TIME PARTIAL: CPT

## 2018-12-26 PROCEDURE — 90715 TDAP VACCINE 7 YRS/> IM: CPT

## 2018-12-26 PROCEDURE — 90471 IMMUNIZATION ADMIN: CPT

## 2018-12-26 PROCEDURE — 70486 CT MAXILLOFACIAL W/O DYE: CPT

## 2018-12-26 PROCEDURE — 99285 EMERGENCY DEPT VISIT HI MDM: CPT | Mod: 25

## 2019-02-07 PROBLEM — M50.90 CERVICAL DISC DISORDER, UNSPECIFIED, UNSPECIFIED CERVICAL REGION: Chronic | Status: ACTIVE | Noted: 2018-10-31

## 2019-02-13 ENCOUNTER — APPOINTMENT (OUTPATIENT)
Dept: PLASTIC SURGERY | Facility: CLINIC | Age: 59
End: 2019-02-13
Payer: COMMERCIAL

## 2019-02-13 PROCEDURE — 99212 OFFICE O/P EST SF 10 MIN: CPT

## 2019-02-13 NOTE — REASON FOR VISIT
[Family Member] : family member [FreeTextEntry1] : DOS 11/13/18 s/p removal of maxillomandibular fixation screws and wires of maxilla and mandible. Pt is c/o stiffness in chin area, pain in left cheek and possible misaligned bite. Here for follow up.

## 2019-07-10 ENCOUNTER — APPOINTMENT (OUTPATIENT)
Dept: PLASTIC SURGERY | Facility: CLINIC | Age: 59
End: 2019-07-10
Payer: MEDICAID

## 2019-07-10 PROCEDURE — 99213 OFFICE O/P EST LOW 20 MIN: CPT

## 2019-07-10 NOTE — REASON FOR VISIT
[Follow-Up: _____] : a [unfilled] follow-up visit [FreeTextEntry1] :  DOS 11/13/18 s/p removal of maxillomandibular fixation screws and wires of maxilla and mandible. Patient c/o at jaw area.

## 2019-07-10 NOTE — HISTORY OF PRESENT ILLNESS
[FreeTextEntry1] : Kuldeep is a 59 year old female who presents for a follow up evaluation.  Patient is s/p removal of maxillomandibular fixation screws and wires of maxilla and mandible on 11/13/18.  Patient complains of right TMJ pain and clicking, and feels as if her teeth do not properly align.  She denies any difficulty breathing or swallowing.

## 2019-07-10 NOTE — PHYSICAL EXAM
[de-identified] : alert, calm, cooperative\par  [de-identified] : respirations are even and unlabored\par  [de-identified] : right TMJ clicking with mouth opening. Right TMJ discomfort upon palpation. Lateral mandibular dental shift upon closure.  Right mandibular gingival buccal incision is well-healed.

## 2019-08-14 ENCOUNTER — APPOINTMENT (OUTPATIENT)
Dept: PLASTIC SURGERY | Facility: CLINIC | Age: 59
End: 2019-08-14
Payer: COMMERCIAL

## 2019-08-14 DIAGNOSIS — S02.609A FRACTURE OF MANDIBLE, UNSPECIFIED, INITIAL ENCOUNTER FOR CLOSED FRACTURE: ICD-10-CM

## 2019-08-14 DIAGNOSIS — Z09 ENCOUNTER FOR FOLLOW-UP EXAMINATION AFTER COMPLETED TREATMENT FOR CONDITIONS OTHER THAN MALIGNANT NEOPLASM: ICD-10-CM

## 2019-08-14 DIAGNOSIS — M26.629 ARTHRALGIA OF TEMPOROMANDIBULAR JOINT,: ICD-10-CM

## 2019-08-14 PROCEDURE — 99213 OFFICE O/P EST LOW 20 MIN: CPT

## 2019-08-14 NOTE — PHYSICAL EXAM
[de-identified] : alert, calm, cooperative\par  [de-identified] : right TMJ clicking with mouth opening. Right TMJ discomfort upon palpation. Lateral mandibular dental shift upon closure.  Right mandibular gingival buccal incision is well-healed. [de-identified] : respirations are even and unlabored\par

## 2019-08-14 NOTE — HISTORY OF PRESENT ILLNESS
[FreeTextEntry1] : Kuldeep is a 59 year old female who presents for a follow up evaluation. Patient is s/p removal of maxillomandibular fixation screws and wires of maxilla and mandible on 11/13/18. Patient complains of right TMJ pain and clicking, and feels as if her teeth do not properly align. She denies any difficulty breathing or swallowing.  She was sent for an orthodontic evaluation with Dr. Guido but did not have it done since he does not accept her insurance.  She did obtain a CT scan that was ordered.

## 2019-08-14 NOTE — REASON FOR VISIT
[Follow-Up: _____] : a [unfilled] follow-up visit [FreeTextEntry1] : DOS 11/13/18 s/p removal of maxillomandibular fixation screws and wires of maxilla and mandible. Patient c/o discomfort at jaw area.

## 2020-01-10 NOTE — ASU PREOP CHECKLIST - SITE MARKED BY SURGEON
Odomzo Counseling- I discussed with the patient the risks of Odomzo including but not limited to nausea, vomiting, diarrhea, constipation, weight loss, changes in the sense of taste, decreased appetite, muscle spasms, and hair loss.  The patient verbalized understanding of the proper use and possible adverse effects of Odomzo.  All of the patient's questions and concerns were addressed. n/a

## 2020-07-06 NOTE — H&P ADULT - HISTORY OF PRESENT ILLNESS
58F who had a mechanical fall with LOC last night around midnight while walking home from a friend's house. She complains that she has been unable to eat food today 2/2 pain and that her occlusion feels abnormal. She also complains that her lower front teeth feel loose and that she has some new chipped teeth. She denies N/V. She denies changes in vision. Pseudotumor cerebri

## 2020-12-21 PROBLEM — Z09 ENCOUNTER FOR EXAMINATION FOLLOWING SURGERY: Status: RESOLVED | Noted: 2019-07-10 | Resolved: 2020-12-21

## 2023-05-02 NOTE — HISTORY OF PRESENT ILLNESS
[de-identified] : 62 yro pt with h/o nasal obstruction and chronic laryngitis was referred by Dr. Gonzalo Treadwell for eval of nasopharynx lesions seen on exam.

## 2023-05-04 ENCOUNTER — APPOINTMENT (OUTPATIENT)
Dept: OTOLARYNGOLOGY | Facility: CLINIC | Age: 63
End: 2023-05-04
